# Patient Record
(demographics unavailable — no encounter records)

---

## 2022-11-14 NOTE — DIAGNOSTIC IMAGING REPORT
INDICATION: Peripheral arterial disease, left leg below the knee

amputation.



TECHNIQUE: Segmental pulse pressures were performed of the upper

and lower extremities.



FINDINGS:



Resting Doppler Blood Pressures

RIGHT

  Brachial:  156 mmHg

  Ankle (Posterior Tibial):  39 mm Hg

         Index: 0.25

  Ankle (Dorsalis Pedis):  162 mm Hg

         Index: 1.04



LEFT

  Brachial:  145 mmHg

  Low thigh:  69 mm Hg

         Index: 0.44





IMPRESSION: 

Ankle-brachial indices as above.







Ankle-Brachial Index      Diagnosis/Interpretation

<=0.90                                Peripheral Arterial Disease

0.91-0.99                            Borderline

1.00-1.40                            Normal

>1.40                                  Concern for

noncompressible arteries,

                                             (assoc with Diabetes

[place])



Dictated by: 



  Dictated on workstation # [id number] Left a detailed message for patient's daughter with results.

## 2023-01-11 NOTE — ED LOWER EXTREMITY
General


Chief Complaint:  Skin/Wound Problems


Stated Complaint:  SKIN INFECTION


Nursing Triage Note:  


Pt to ER via wheelchair w c/o wound on right foot. Sent by wound clinic for 


possible surgery consult.


Source:  patient


Exam Limitations:  no limitations





History of Present Illness


Date Seen by Provider:  Jan 11, 2023


Time Seen by Provider:  16:25


Initial Comments


Report obtained from history and review of wound care note from earlier today.





Patient is a 44-year-old male with an extensive history of diabetes, lower 

extremity venous insufficiency, several venous stasis and diabetic lower 

extremity ulcers, left BKA who presents to the emergency department for 

evaluation of a rapidly worsening wound to his right foot.  Patient was seen at 

wound care earlier today where the provider was concerned that the rapid 

worsening of the wound could be evidence of deep space infection or necrotizing 

fasciitis.  Patient denies any pain in the area.  Denies any fever or other 

systemic symptoms.  Patient has been on Zyvox for antimicrobial coverage for 

quite some time.





Allergies and Home Medications


Allergies


Coded Allergies:  


     levofloxacin (Verified  Allergy, Mild, 4/14/15)





Patient Home Medication List


Home Medication List Reviewed:  Yes


Aspirin (Aspirin EC) 81 Mg Tablet.dr, 81 MG PO DAILY, (Reported)


   Entered as Reported by: DENISE TAYLOR on 4/28/20 1436


   Last Action: Continued


Atorvastatin Calcium (Atorvastatin Calcium) 80 Mg Tablet, 80 MG PO DAILY, 

(Reported)


   Entered as Reported by: DENISE TAYLOR on 3/24/21 1229


   Last Action: Continued


Celecoxib (Celebrex) 100 Mg Capsule, 100 MG PO BID


   Prescribed by: NICOLE VILA on 1/11/23 1928


   Last Action: Continued


Empagliflozin (Jardiance) 10 Mg Tablet, 10 MG PO DAILY


   Prescribed by: NICOLE VILA on 1/11/23 1928


   Last Action: Continued


Insulin Detemir (Levemir Flextouch) 100 Unit/1 Ml Insuln.pen, 15 UNIT SQ BID


   Prescribed by: NICOLE VILA on 5/18/21 0627


   Last Action: Converted


Lisinopril/Hydrochlorothiazide (Lisinopril-Hctz 20-25 mg Tab) 1 Each Tablet, 1 

EACH PO DAILY, (Reported)


   Entered as Reported by: DENISE TAYLOR on 4/28/20 1434


   Last Action: Converted


Prasugrel HCl (Prasugrel HCl) 10 Mg Tablet, 10 MG PO DAILY, (Reported)


   Entered as Reported by: DENISE TAYLOR on 3/24/21 1229


   Last Action: Continued


Discontinued Medications


Calcium Carbonate (Tums Ultra) 400 Mg Tab.chew, 400-800 MG PO PRN PRN for 

INDIGESTION, (Reported)


   Entered as Reported by: DENISE TAYLOR on 3/24/21 1229


   Last Action: Discontinued


Gabapentin (Gabapentin) 100 Mg Capsule, 300 MG PO TID


   Prescribed by: NICOLE VILA on 5/18/21 0627


   Last Action: Discontinued


Glyburide (Glyburide) 2.5 Mg Tablet, 2.5 MG PO BID, (Reported)


   Entered as Reported by: DENISE TAYLOR on 4/28/20 1421


   Last Action: Discontinued


Insulin Aspart (Novolog Flexpen) 300 Units/3 Ml Solution, 8 UNITS SQ AC


   Prescribed by: NICOLE VILA on 5/18/21 0627


   Last Action: Discontinued


Metformin HCl (Metformin HCl ER) 500 Mg Tab.er.24, 500 MG PO BID, (Reported)


   Entered as Reported by: DENISE TAYLOR on 4/28/20 1420


   Last Action: Discontinued


Metoprolol Succinate (Metoprolol Succinate) 100 Mg Tab.er.24h, 100 MG PO DAILY, 

(Reported)


   Entered as Reported by: DENISE TAYLOR on 3/24/21 1229


   Last Action: Discontinued


Miconazole Nitrate (Lotrimin AF) 90 Gm Powder, 0 GM TOP BID


   Prescribed by: NICOLE VILA on 5/18/21 0627


   Last Action: Discontinued


Oxycodone HCl (Oxycodone HCl ER) 10 Mg Tab.er.12h, 10 MG PO BID


   Prescribed by: NICOLE VILA on 5/18/21 0627


   Last Action: Discontinued


Oxycodone Hcl (Oxyir Tablet) 5 Mg Tab, 10 MG PO DAILY


   Prescribed by: NICOLE VILA on 5/18/21 0627


   Last Action: Discontinued


Pantoprazole Sodium (Pantoprazole Sodium) 40 Mg Tablet.dr, 40 MG PO DAILY


   Prescribed by: NICOLE VILA on 5/18/21 0627


   Last Action: Discontinued





Review of Systems


Constitutional:  no symptoms reported


EENTM:  no symptoms reported


Respiratory:  no symptoms reported


Cardiovascular:  no symptoms reported


Gastrointestinal:  no symptoms reported


Genitourinary:  no symptoms reported


Musculoskeletal:  no symptoms reported


Skin:  no symptoms reported


Psychiatric/Neurological:  No Symptoms Reported





Past Medical-Social-Family Hx


Patient Social History


Tobacco Use?:  No


Substance use?:  No


Alcohol Use?:  Yes


Alcohol Frequency:  Once in a while





Immunizations Up To Date


Tetanus Booster (TDap):  More than 5yrs


COVID19 Vaccine :  pzifer





Seasonal Allergies


Seasonal Allergies:  No





Past Medical History


Surgeries:  Yes (SURGERY ON SCROTAL ABSCESS 2015, 4 cardiac stents, multiple 

left foot )


Orthopedic


Respiratory:  No


Currently Using CPAP:  No


Currently Using BIPAP:  No


Cardiac:  Yes


High Cholesterol, Hypertension


Neurological:  Yes


Neuropathy


Reproductive Disorders:  No


Sexually Transmitted Disease:  No


HIV/AIDS:  No


Genitourinary:  No


Gastrointestinal:  No


Musculoskeletal:  Yes


Amputee, Chronic Back Pain


Endocrine:  Yes (MORBID OBESITY)


Diabetes, Insulin dep


HEENT:  No


Loss of Vision:  Denies


Hearing Impairment:  Denies


Cancer:  No


Psychosocial:  No


Integumentary:  Yes (SCROTAL ABSCESS 2015; LEFT FOOT DIABETIC FOOT ULCER DX 

09/18/17)


Recent Skin Changes


Blood Disorders:  No


Adverse Reaction/Blood Tranf:  No





Family Medical History





Completed stroke


  19 MOTHER


Diabetes mellitus


  19 MOTHER


Hypertension


  19 MOTHER


  G8 BROTHER


Myocardial infarction


  19 FATHER


CAD Under 55 Years Old, Diabetes, Hypertension, Other Conditions/Hx





Physical Exam


Vital Signs





Vital Signs - First Documented








 1/11/23





 16:30


 


Temp 35.8


 


Pulse 92


 


Resp 20


 


B/P (MAP) 129/82 (98)


 


Pulse Ox 95


 


O2 Delivery Room Air





Capillary Refill : Less Than 3 Seconds


Height, Weight, BMI


Height: 6'0.00"


Weight: 422lbs. 6.4oz. [id number]; 62.00 BMI


Method:Stated


General Appearance:  WD/WN, no apparent distress


HEENT:  PERRL/EOMI, normal ENT inspection, TMs normal, pharynx normal


Neck:  non-tender, full range of motion, supple, normal inspection


Cardiovascular:  regular rate, rhythm


Respiratory:  chest non-tender, lungs clear, normal breath sounds, no 

respiratory distress, no accessory muscle use


Gastrointestinal:  normal bowel sounds, non tender, soft


Neurologic/Psychiatric:  no motor/sensory deficits, alert, normal mood/affect, 

oriented x 3


Ulcerated lesion noted to the foot in the intertriginous skin between the fourth

and fifth digits of the right foot; large amount of macerated tissue noted to 

the dorsum of the foot extending from the fifth digit to the second digit; a 

second ulcerated lesion noted to the midfoot of the right foot





Progress/Results/Core Measures


Results/Orders


Lab Results





Laboratory Tests








Test


 1/11/23


17:10 Range/Units


 


 


White Blood Count


 9.6 


 4.3-11.0


10^3/uL


 


Red Blood Count


 4.27 L


 4.30-5.52


10^6/uL


 


Hemoglobin 11.5 L 13.3-17.7  g/dL


 


Hematocrit 37 L 40-54  %


 


Mean Corpuscular Volume 86  80-99  fL


 


Mean Corpuscular Hemoglobin 27  25-34  pg


 


Mean Corpuscular Hemoglobin


Concent 31 L


 32-36  g/dL





 


Red Cell Distribution Width 15.3 H 10.0-14.5  %


 


Platelet Count


 479 H


 130-400


10^3/uL


 


Mean Platelet Volume 9.0  9.0-12.2  fL


 


Immature Granulocyte % (Auto) 2   %


 


Neutrophils (%) (Auto) 65  42-75  %


 


Lymphocytes (%) (Auto) 24  12-44  %


 


Monocytes (%) (Auto) 5  0-12  %


 


Eosinophils (%) (Auto) 3  0-10  %


 


Basophils (%) (Auto) 1  0-10  %


 


Neutrophils # (Auto)


 6.2 


 1.8-7.8


10^3/uL


 


Lymphocytes # (Auto)


 2.4 


 1.0-4.0


10^3/uL


 


Monocytes # (Auto)


 0.5 


 0.0-1.0


10^3/uL


 


Eosinophils # (Auto)


 0.3 


 0.0-0.3


10^3/uL


 


Basophils # (Auto)


 0.1 


 0.0-0.1


10^3/uL


 


Immature Granulocyte # (Auto)


 0.2 H


 0.0-0.1


10^3/uL


 


Sodium Level 136  135-145  MMOL/L


 


Potassium Level 5.1 H 3.6-5.0  MMOL/L


 


Chloride Level 103    MMOL/L


 


Carbon Dioxide Level 21  21-32  MMOL/L


 


Anion Gap 12  5-14  MMOL/L


 


Blood Urea Nitrogen 34 H 7-18  MG/DL


 


Creatinine


 1.21 


 0.60-1.30


MG/DL


 


Estimat Glomerular Filtration


Rate 76 


  





 


BUN/Creatinine Ratio 28   


 


Glucose Level 101    MG/DL


 


Lactic Acid Level


 1.33 


 0.50-2.00


MMOL/L


 


Calcium Level 9.2  8.5-10.1  MG/DL


 


Corrected Calcium 9.6  8.5-10.1  MG/DL


 


Total Bilirubin 0.2  0.1-1.0  MG/DL


 


Aspartate Amino Transf


(AST/SGOT) 23 


 5-34  U/L





 


Alanine Aminotransferase


(ALT/SGPT) 32 


 0-55  U/L





 


Alkaline Phosphatase 83    U/L


 


C-Reactive Protein High


Sensitivity 8.52 H


 0.00-0.50


MG/DL


 


Total Protein 8.1  6.4-8.2  GM/DL


 


Albumin 3.5  3.2-4.5  GM/DL








My Orders





Orders - GERA DUMONT APRMARIA TERESA


Cbc With Automated Diff (1/11/23 16:40)


Comprehensive Metabolic Panel (1/11/23 16:40)


Hs C Reactive Protein (1/11/23 16:40)


Iv/Invasive Line Insertion .IV INSERT (1/11/23 16:40)


Foot, Right, 3 View (1/11/23 16:40)


Blood Culture (1/11/23 16:44)


Lactic Acid Analyzer (1/11/23 16:44)


Piperacillin Sodium/Tazobactam (Zosyn Vi (1/11/23 17:30)


Vancomycin Injection (Vancomycin Injecti (1/11/23 17:30)


Nothing By Mouth (1/11/23 Dinner)





Medications Given in ED





Current Medications








 Medications  Dose


 Ordered  Sig/Rl


 Route  Start Time


 Stop Time Status Last Admin


Dose Admin


 


 Piperacillin Sod/


 Tazobactam Sod


 4.5 gm/Sodium


 Chloride  100 ml @ 


 200 mls/hr  ONCE  ONCE


 IV  1/11/23 17:30


 1/11/23 17:59 DC 1/11/23 17:47


200 MLS/HR


 


 Vancomycin HCl


 1000 mg/Sodium


 Chloride  250 ml @ 


 250 mls/hr  ONCE  ONCE


 IV  1/11/23 17:30


 1/11/23 18:29  1/11/23 18:01


250 MLS/HR








Vital Signs/I&O











 1/11/23





 16:30


 


Temp 35.8


 


Pulse 92


 


Resp 20


 


B/P (MAP) 129/82 (98)


 


Pulse Ox 95


 


O2 Delivery Room Air














Blood Pressure Mean:                    98











Progress


Progress Note :  


   Progress Note


Patient is nontoxic and well-hydrated on exam.  Examination of the dorsum of the

right foot reveals extensive macerated tissue and ulceration.  Patient's vital 

signs are stable.





Orders placed for CBC, CMP, CRP, IV insertion, right foot x-rays, lactic acid, 

and blood cultures.





CBC does not reveal leukocytosis.  Mild anemia noted.  Elevated platelets noted.

 CMP shows mild hyperkalemia.  CRP is elevated to above 8 today.  Last CRP was 

6.21 on 12/28 as noted on the wound care note.  Foot x-ray reveals some bony 

destruction concerning for osteomyelitis as well as some possible free air.





I spoke with Dr. Agrawal with general surgery who came in and physically assessed

the patient.  Patient will be taken to the OR for debridement and possible 

partial foot amputation.  I spoke with Dr. Vila with hospital medicine who 

kindly agreed to admit the patient.  Orders for IV vancomycin and Zosyn replaced

in the ER.  Patient was updated on plan of care and plan for admission.  He 

verbalized understanding.





Departure


Impression





   Primary Impression:  


   Diabetic ulcer of right foot


   Qualified Codes:  E11.621 - Type 2 diabetes mellitus with foot ulcer; L97.518

   - Non-pressure chronic ulcer of other part of right foot with other specified

   severity


Disposition:  09 ADMITTED AS INPATIENT


Condition:  Stable





Admissions


Decision to Admit Reason:  Admit from ER (General)


Decision to Admit/Date:  Jan 11, 2023


Time/Decision to Admit Time:  18:25





Departure-Patient Inst.


Referrals:  


[facility] (PCP/Family)


Primary Care Physician


Scripts


Empagliflozin (Jardiance) 10 Mg Tablet


10 MG PO DAILY for 30 Days, TAB


   Prov: NICOLE VILA DO         1/11/23 


Celecoxib (Celebrex) 100 Mg Capsule


100 MG PO BID for 30 Days, CAP


   Prov: NICOLE VILA DO         1/11/23











GERA DUMONT             Jan 11, 2023 17:20

## 2023-01-11 NOTE — CONSULTATION - SURGERY
History of Present Illness


History of Present Illness


Patient Consulted On(steff/time)


1/11/23


 18:44


Date Seen by Provider:  Jan 11, 2023


Time Seen by Provider:  18:45


History of Present Illness


Consult requested by Feliz Tee for right foot wound.





Patient is a 44 year old male with right foot wound.  He had a heel wound 6 

months ago which he was working with wound care and has healed.  He subsequently

developed a blister to the right plantar forefoot.  Has been continuing wound 

care.  Over the last couple days this has worsened.  He was sent to ED for 

further evaluation.  He had a right foot x ray with findings suspicious for 

osteomyelitis involving the fifth metatarsal head and adjacent proximal fifth 

phalanx with a


superimposed fracture of the fifth proximal phalanx suspected. 2. Subcutaneous 

air overlying the fifth metatarsophalangeal joint level. This could be due to an

overlying ulceration. Necrotizing fasciitis not excluded. Dedicated MRI of the 

forefoot with and


without contrast could provide further characterization. He had necrotizing 

fasciitis on left lower leg which he had to have left bka for.  Patient with 

sharp tingling pain that is constant, but states doesn't really have sensation 

to the foot. Has a burn to the left bka but this is almost healed, he is not 

wearing prosthetic because of it.





Allergies and Home Medications


Allergies


Coded Allergies:  


     levofloxacin (Verified  Allergy, Mild, 4/14/15)





Patient Home Medication List


Home Medication List Reviewed:  Yes


Aspirin (Aspirin EC) 81 Mg Tablet.dr, 81 MG PO DAILY, (Reported)


   Entered as Reported by: DENISE TAYLOR on 4/28/20 1436


Atorvastatin Calcium (Atorvastatin Calcium) 80 Mg Tablet, 80 MG PO DAILY, 

(Reported)


   Entered as Reported by: DENISE TAYLOR on 3/24/21 1229


Calcium Carbonate (Tums Ultra) 400 Mg Tab.chew, 400-800 MG PO PRN PRN for 

INDIGESTION, (Reported)


   Entered as Reported by: DENISE TAYLOR on 3/24/21 1229


Gabapentin (Gabapentin) 100 Mg Capsule, 300 MG PO TID


   Prescribed by: NICOLE VILA on 5/18/21 0627


Glyburide (Glyburide) 2.5 Mg Tablet, 2.5 MG PO BID, (Reported)


   Entered as Reported by: DENISE TAYLOR on 4/28/20 1421


Insulin Aspart (Novolog Flexpen) 300 Units/3 Ml Solution, 8 UNITS SQ AC


   Prescribed by: NICOLE VILA on 5/18/21 0627


Insulin Detemir (Levemir Flextouch) 100 Unit/1 Ml Insuln.pen, 15 UNIT SQ BID


   Prescribed by: NICOLE VILA on 5/18/21 0627


Lisinopril/Hydrochlorothiazide (Lisinopril-Hctz 20-25 mg Tab) 1 Each Tablet, 1 

EACH PO DAILY, (Reported)


   Entered as Reported by: DENISE TAYLOR on 4/28/20 1434


Metformin HCl (Metformin HCl ER) 500 Mg Tab.er.24, 500 MG PO BID, (Reported)


   Entered as Reported by: DENISE TAYLOR on 4/28/20 1420


Metoprolol Succinate (Metoprolol Succinate) 100 Mg Tab.er.24h, 100 MG PO DAILY, 

(Reported)


   Entered as Reported by: DENISE TAYLOR on 3/24/21 1229


Miconazole Nitrate (Lotrimin AF) 90 Gm Powder, 0 GM TOP BID


   Prescribed by: NICOLE VILA on 5/18/21 0627


Oxycodone HCl (Oxycodone HCl ER) 10 Mg Tab.er.12h, 10 MG PO BID


   Prescribed by: NICOLE VILA on 5/18/21 0627


Oxycodone Hcl (Oxyir Tablet) 5 Mg Tab, 10 MG PO DAILY


   Prescribed by: NICOLE VIAL on 5/18/21 0627


Pantoprazole Sodium (Pantoprazole Sodium) 40 Mg Tablet.dr, 40 MG PO DAILY


   Prescribed by: NICOLE VILA on 5/18/21 0627


Prasugrel HCl (Prasugrel HCl) 10 Mg Tablet, 10 MG PO DAILY, (Reported)


   Entered as Reported by: DENISE TAYLOR on 3/24/21 1229





Past Medical-Social-Family Hx


Patient Social History


Former Smoker, Quit:  Jan 1, 2003


Type Used:  Cigarettes, Electronic/Vapor


Recent Hopitalizations:  No


Alcohol Use?:  Yes





Immunizations Up To Date


Tetanus Booster (TDap):  More than 5yrs


Date of Pneumonia Vaccine:  Jan 1, 2007





Seasonal Allergies


Seasonal Allergies:  No





Surgeries


History of Surgeries:  Yes (SURGERY ON SCROTAL ABSCESS 2015, 4 cardiac stents, 

multiple left foot/bka)


Surgeries:  Orthopedic





Respiratory


History of Respiratory Disorde:  No





Cardiovascular


History of Cardiac Disorders:  Yes


Cardiac Disorders:  High Cholesterol, Hypertension





Neurological


History of Neurological Disord:  Yes


Neurological Disorders:  Neuropathy





Reproductive System


Hx Reproductive Disorders:  No


Sexually Transmitted Disease:  No


HIV/AIDS:  No





Genitourinary


History of Genitourinary Disor:  No





Gastrointestinal


History of Gastrointestinal Di:  No





Musculoskeletal


History of Musculoskeletal Dis:  Yes


Musculoskeletal Disorders:  Amputee, Chronic Back Pain





Endocrine


History of Endocrine Disorders:  Yes (MORBID OBESITY)


Endocrine Disorders:  Diabetes, Insulin dep





HEENT


History of HEENT Disorders:  No


Loss of Vision:  Denies


Hearing Impairment:  Denies





Cancer


History of Cancer:  No





Psychosocial


History of Psychiatric Problem:  No





Integumentary


History of Skin or Integumenta:  Yes (SCROTAL ABSCESS 2015; LEFT FOOT DIABETIC 

FOOT ULCER DX 09/18/17)


Skin/Integumentary Disorders:  Recent Skin Changes





Blood Transfusions


History of Blood Disorders:  No


Adverse Reaction to a Blood Tr:  No





Reviewed Nursing Assessment


Reviewed/Agree w Nursing PMH:  Yes





Family Medical History


Significant Family History:  CAD Under 55 Years Old, Diabetes, Hypertension, 

Other Conditions/Hx


Family Medial History:  


Completed stroke


  19 MOTHER


Diabetes mellitus


  19 MOTHER


Hypertension


  19 MOTHER


  G8 BROTHER


Myocardial infarction


  19 FATHER





Review of Systems-General


Constitutional:  No chills, No diaphoresis


EENTM:  No blurred vision, No double vision


Respiratory:  No cough, No dyspnea on exertion


Cardiovascular:  No chest pain, No palpitations


Gastrointestinal:  No nausea, No vomiting


Genitourinary:  No decreased output, No discharge


Musculoskeletal:  No back pain, No joint pain


Skin:  change in color; No change in hair/nails


Psychiatric/Neurological:  Denies Anxiety, Denies Depressed, Denies Emotional 

Problems


All Other Systems Reviewed


Negative Unless Noted:  Yes (Negative excepted noted.)





Physical Exam-General Problems


Physical Exam


Vital Signs





Vital Signs - First Documented








 1/11/23





 16:30


 


Temp 35.8


 


Pulse 92


 


Resp 20


 


B/P (MAP) 129/82 (98)


 


Pulse Ox 95


 


O2 Delivery Room Air





Capillary Refill : Less Than 3 Seconds


General Appearance:  WD/WN, no apparent distress, obese


HEENT:  PERRL/EOMI, normal ENT inspection


Neck:  non-tender, supple


Respiratory:  chest non-tender, no respiratory distress, no accessory muscle use


Cardiovascular:  regular rate, rhythm, no JVD


Gastrointestinal:  non tender, soft


Rectal:  deferred


Back:  no CVA tenderness


Extremities:  non-tender, other (left bka, right plantar surface necrotic wound 

approximately 4x5 cm and 5 toe edematous and ulcerated)


Neurologic/Psychiatric:  alert, oriented x 3, other (decreased sensation lower 

ext)


Skin:  other (necrotic appearance of tissue right foot, chronic skin changes 

right lower extremity,  left bka lateral aspect scab from burn)


Lymphatic:  no adenopathy





Data Review


Labs


Laboratory Tests


1/11/23 17:10: 


White Blood Count 9.6, Red Blood Count 4.27L, Hemoglobin 11.5L, Hematocrit 37L, 

Mean Corpuscular Volume 86, Mean Corpuscular Hemoglobin 27, Mean Corpuscular 

Hemoglobin Concent 31L, Red Cell Distribution Width 15.3H, Platelet Count 479H, 

Mean Platelet Volume 9.0, Immature Granulocyte % (Auto) 2, Neutrophils (%) 

(Auto) 65, Lymphocytes (%) (Auto) 24, Monocytes (%) (Auto) 5, Eosinophils (%) 

(Auto) 3, Basophils (%) (Auto) 1, Neutrophils # (Auto) 6.2, Lymphocytes # (Auto)

2.4, Monocytes # (Auto) 0.5, Eosinophils # (Auto) 0.3, Basophils # (Auto) 0.1, 

Immature Granulocyte # (Auto) 0.2H, Sodium Level 136, Potassium Level 5.1H, Chl

oride Level 103, Carbon Dioxide Level 21, Anion Gap 12, Blood Urea Nitrogen 34H,

Creatinine 1.21, Estimat Glomerular Filtration Rate 76, BUN/Creatinine Ratio 28,

Glucose Level 101, Lactic Acid Level 1.33, Calcium Level 9.2, Corrected Calcium 

9.6, Total Bilirubin 0.2, Aspartate Amino Transf (AST/SGOT) 23, Alanine 

Aminotransferase (ALT/SGPT) 32, Alkaline Phosphatase 83, C-Reactive Protein High

Sensitivity 8.52H, Total Protein 8.1, Albumin 3.5





Assessment/Plan


right foot osteomyelitis


plantar foot ulcer with concern for necrotizing fasciitis


morbid obesity


DM insulin dependent


Long term antiplatelet








Concern for patient having necrotizing fasciitis, X rays reviewed with him.  


Patient understands need for operative intervention.  He understands risks


and benefits of partial right foot amputation all other indicated procedures.


On Vanc/Zosyn


NPO


IV fluids


Consent


To OR.











BRYAN NG DO              Jan 11, 2023 18:50

## 2023-01-11 NOTE — DIAGNOSTIC IMAGING REPORT
INDICATION: Worsening foot ulcers. Necrotizing infection.



EXAMINATION: Right foot from 01/11/2023.



FINDINGS: Three views of the foot.



There is subcutaneous air along the lateral soft tissues of the

forefoot overlying the fifth metatarsophalangeal joint. Marked

soft tissue swelling about the fifth phalanx is noted. There is a

lucency throughout the mid and proximal aspects of the proximal

fifth phalanx with a superimposed fracture. Lucency along the

distal fifth metatarsal is also noted and findings are suspicious

for osteomyelitis.



Marked degenerative changes with spurring and narrowing noted at

the second metatarsophalangeal joint. The remaining osseous

structures appear unremarkable. Plantar and posterior calcaneal

spurring noted.



IMPRESSION:

1. Findings suspicious for osteomyelitis involving the fifth

metatarsal head and adjacent proximal fifth phalanx with a

superimposed fracture of the fifth proximal phalanx suspected.

2. Subcutaneous air overlying the fifth metatarsophalangeal joint

level. This could be due to an overlying ulceration. Necrotizing

fasciitis not excluded. Dedicated MRI of the forefoot with and

without contrast could provide further characterization.



Dictated by: 



  Dictated on workstation # [id number]

## 2023-01-11 NOTE — ANESTHESIA-GENERAL POST-OP
General


Patient Condition


Mental Status/LOC:  Same as Preop


Cardiovascular:  Satisfactory


Nausea/Vomiting:  Absent


Respiratory:  Satisfactory


Pain:  Controlled


Complications:  Absent





Post Op Complications


Complications


None





Follow Up Care/Instructions


Patient Instructions


None needed.





Anesthesia/Patient Condition


Patient Condition


Patient is doing well, no complaints, stable vital signs, no apparent adverse 

anesthesia problems.   


No complications reported per nursing.


D/C home per [facility] Criteria:  Yes











YANG CHANDLER CRNA           Jan 11, 2023 20:35

## 2023-01-12 NOTE — HISTORY & PHYSICAL-HOSPITALIST
TABITHA CONLEY 1/12/23 1340:


History of Present Illness


HPI/Chief Complaint


Aj Gary is a 44 year old male with history of venous insufficiency, katrin

betes, history of cardiac catheterization, and diabetic neuropathy who presented

to the ED after request from wound care. He follows with wound care weekly for 

diabetic foot ulcers. Between visits this past week, he noticed that his blood 

sugar levels were difficult to control. Upon subsequent encounter with wound 

care he was directed to go to the ED for further evaluation. He was seen and 

evaluated by surgery, and underwent right 5th digit amputation and debridement. 

Medicine team is following peripherally for post operative medical management. 

Upon examination today, his foot had been rewrapped following post operative 

rebleed which surgery stopped at bedside. He is eating well, voiding and BM's 

regular. No chest pain, cough, fever, shortness of breath or pain at operative 

site.


Source:  patient


Exam Limitations:  no limitations


Date Seen


1/12/23


Time Seen by a Provider:  07:50


Attending Physician


[facility]


PCP


Admitting Physician:


Siobhan Barrera DO 








Attending Physician:


Siobhan Barrera DO


Referring Physician





Date of Admission


Jan 11, 2023 at 22:37





Home Medications & Allergies


Home Medications


Reviewed patient Home Medication Reconciliation performed by pharmacy medication

reconciliations technician and/or nursing.


Patients Allergies have been reviewed.





Allergies





Allergies


Coded Allergies


  levofloxacin (Verified Allergy, Mild, 4/14/15)








Past Medical-Social-Family Hx


Patient Social History


Tobacco Use?:  No


Use of E-Cig and/or Vaping dev:  No


Substance use?:  No


Alcohol Use?:  Yes


Alcohol Frequency:  Once in a while


Pt feels they are or have been:  No





Immunizations Up To Date


Tetanus Booster (TDap):  Unknown


Hepatitis A:  Yes


Hepatitis B:  Yes


Date of Pneumonia Vaccine:  Jan 1, 2007





Seasonal Allergies


Seasonal Allergies:  No





Current Status


Advance Directives:  No


Communicates:  Verbally


Primary Language:  English


Preferred Spoken Language:  English


Is interpretation needed?:  No


Sensory deficits:  Vision impairment


Implanted or Applied Medical D:  Stents





Past Medical History


Surgeries:  Orthopedic


Currently Using CPAP:  No


Currently Using BIPAP:  No


High Cholesterol, Hypertension


Neuropathy


Sexually Transmitted Disease:  No


HIV/AIDS:  No


Amputee, Chronic Back Pain


Diabetes, Insulin dep


Loss of Vision:  Denies


Hearing Impairment:  Denies


Recent Skin Changes


Blood Disorders:  No


Adverse Reaction/Blood Tranf:  No





Past medical history


1.  Diabetes mellitus type II


2.  hospitalizations for HHN K


3.  Morbid obesity


4.  Patella fracture and meniscus injury of the left knee


5.  Diabetic peripheral neuropathy


6.  Chronic venous insufficiency with chronic skin changes


7. Tibia (2x) and fibula fracture of left leg


8. Tibia and fibula fracture of right leg


9. Diabetic arthritis of left knee


10. Diabetic right frozen shoulder


11. HTN


SurgHx:


Removal of infected bone/bone fragments from left midfoot





Family Medical History





Completed stroke


  19 MOTHER


Diabetes mellitus


  19 MOTHER


Hypertension


  19 MOTHER


  G8 BROTHER


Myocardial infarction


  19 FATHER


CAD Under 55 Years Old, Diabetes, Hypertension, Other Conditions/Hx





Review of Systems


Constitutional:  see HPI


EENTM:  see HPI


Respiratory:  see HPI


Cardiovascular:  see HPI


Gastrointestinal:  see HPI


Genitourinary:  see HPI


Musculoskeletal:  see HPI


Skin:  see HPI


Psychiatric/Neurological:  See HPI





Physical Exam


Physical Exam


Vital Signs





Vital Signs - First Documented








 1/11/23 1/11/23





 16:30 21:41


 


Temp 35.8 


 


Pulse 92 


 


Resp 20 


 


B/P (MAP) 129/82 (98) 


 


Pulse Ox 95 


 


O2 Delivery Room Air 


 


FiO2  21





Capillary Refill : Less Than 3 Seconds


Height, Weight, BMI


Height: 6'0.00"


Weight: 422lbs. 6.4oz. [id number]; 65.03 BMI


Method:Stated


General Appearance:  No Apparent Distress, Obese


Eyes:  Bilateral Eye Normal Inspection, Bilateral Eye PERRL, Bilateral Eye EOMI


HEENT:  PERRL/EOMI, Normal ENT Inspection, Pharynx Normal


Neck:  Full Range of Motion, Normal Inspection, Supple


Respiratory:  Chest Non Tender, Lungs Clear, Normal Breath Sounds


Cardiovascular:  Regular Rate, Rhythm, No Edema


Gastrointestinal:  Normal Bowel Sounds, No Organomegaly


Extremity:  Other (Left leg BKA, Right foot in clean dressing with no visable 

surrounding erythema)


Neurologic/Psychiatric:  Alert, Oriented x3, No Motor/Sensory Deficits, Normal 

Mood/Affect


Skin:  Tattoos/Piercings (On chest)





Results


Results/Procedures


Labs


Laboratory Tests


1/11/23 17:10








1/12/23 05:24








Patient resulted labs reviewed.





Assessment/Plan


Admission Diagnosis


Infected Diabetic Foot Ulcer





Assessment and Plan


Diabetic foot ulcer complicated by necrotizing infection


- Foot x ray suspicious for osteomyelitis involving fifth met head, with 

superimposed fracture of fifth proximal phalanx and subcutaneous air overlying 

the fifth metatarsophalangeal joint.


- Right fifth digit and fifth metatarsal head amputation - POD 1


- Post-operative management by Surgery


- Longstanding history of diabetes


- Follow with wound care after d/c


- WBC stable


- on Vanc, Pip tazo





Anemia


- Hgb 9.4


- will monitor





HTN


- continue home medications





Diabetes mellitus


- continue home medicine


- on ISS


- monitor glucose





Dyslipidemia 


- on Atorvstatin


- monitor AST/ALT





Peripheral neuropathy


- From diabetes


- Continue diabetic management


- Gabapentin prn for neuralgia





History of Obesity


- encourage calorie restriction


- prn position changes





LLE BKA


- PT/OT





Clinical Quality Measures


DVT/VTE Risk/Contraindication:


Contraindications-Mechi:  Other *list below*


Other:  


infected ulcer





SIOBHAN BARRERA DO 1/13/23 0520:


History of Present Illness


Source:  patient


Exam Limitations:  no limitations





Past Medical-Social-Family Hx


Patient Social History


Marrital Status:  


Employed/Student:  unemployed





Family Medical History





Completed stroke


  19 MOTHER


Diabetes mellitus


  19 MOTHER


Hypertension


  19 MOTHER


  G8 BROTHER


Myocardial infarction


  19 FATHER





Review of Systems


Constitutional:  see HPI





Physical Exam


Physical Exam


General Appearance:  No Apparent Distress, Chronically ill, Obese


Respiratory:  Lungs Clear, Normal Breath Sounds


Cardiovascular:  Regular Rate, Rhythm





Assessment/Plan


Admission Diagnosis


Assessment:


Diabetic foot ulcer right fifth toe status post amputation


Cellulitis


Diabetes


Obesity


Hypertension





Plan:


IV antibiotics


Supportive care


Admission Status:  Inpatient Order (span 2 midnights)


Reason for Inpatient Admission:  


Diabetic foot ulcer





Supervisory-Addendum Brief


Verification & Attestation


Participated in pt care:  history, MDM, physical


Personally performed:  exam, history, MDM, supervision of care


Care discussed with:  Medical Student


Procedures:  n/a


Results interpretation:  Verified all documentation


Verification and Attestation of Medical Student E/M Service





A medical student performed and documented this service in my presence. I 

reviewed and verified all information documented by the medical student and made

modifications to such information, when appropriate. I personally performed the 

physical exam and medical decision making. 





 Siobhan Barrera, Jan 13, 2023,05:20











TABITHA CONLEY               Jan 12, 2023 13:40


SIOBHAN BARRERA DO                Jan 13, 2023 05:20

## 2023-01-13 NOTE — PROGRESS NOTE - HOSPITALIST
TABITHA CONLEY 1/13/23 1327:


Subjective


HPI/CC On Admission


Aj Gary is a 44 year old male with history of venous insufficiency, 

diabetes, history of cardiac catheterization, and diabetic neuropathy who 

presented to the ED after request from wound care. He follows with wound care 

weekly for diabetic foot ulcers. Between visits this past week, he noticed that 

his blood sugar levels were difficult to control. Upon subsequent encounter with

wound care he was directed to go to the ED for further evaluation. He was seen 

and evaluated by surgery, and underwent right 5th digit amputation and 

debridement. Medicine team is following peripherally for post operative medical 

management.


Subjective/Events-last exam


Aj Gary is a 44 year old male with history of venous insufficiency, 

diabetes, history of cardiac catheterization, and diabetic neuropathy admitted 

for necrotizing diabetic foot ulcer. Today is POD 2 following amputation and 

debridement of the right fifth toe. No acute events overnight. Reports BG is a 

little high. No pain at operative site. Denies cough, chest pain, or swelling. 

Is having BM and voiding regularly. Tolerating antibiotics well. Reports some 

bleeding from recent transfer to and from bedside commode.





Focused Exam


Lactate Level


1/11/23 17:10: Lactic Acid Level 1.33








Objective


Exam


Vital Signs





Vital Signs








  Date Time  Temp Pulse Resp B/P (MAP) Pulse Ox O2 Delivery O2 Flow Rate FiO2


 


1/13/23 11:55 36.2 80 18 138/84 (102) 95 Room Air  


 


1/11/23 21:41        21


 


1/11/23 20:40       10.00 





Capillary Refill : Less Than 3 Seconds


General Appearance:  No Apparent Distress, WD/WN, Obese


HEENT:  PERRL/EOMI, TMs Normal, Normal ENT Inspection, Pharynx Normal, Moist 

Mucous Membranes


Neck:  Full Range of Motion, Normal Inspection, Non Tender, Supple


Respiratory:  Chest Non Tender, Lungs Clear, Normal Breath Sounds


Cardiovascular:  Regular Rate, Rhythm, No Edema, No Gallop, No Murmur


Gastrointestinal:  Normal Bowel Sounds, No Organomegaly, No Pulsatile Mass, Non 

Tender, Soft


Rectal:  Deferred


Extremity:  Normal Capillary Refill, Normal Inspection, Normal Range of Motion, 

Other (LLE BKA, no pain at operative site)


Neurologic/Psychiatric:  Alert, Oriented x3, Normal Mood/Affect, CNs II-XII Norm

as Tested, Sensory Deficit (no sensation up to level of knees.)


Skin:  Normal Color (site dressed with mild serosanguinous discharge)





Results/Procedures


Lab


Laboratory Tests


1/13/23 05:00








Patient resulted labs reviewed.





Assessment/Plan


Assessment and Plan


Assess & Plan/Chief Complaint


Diabetic foot ulcer complicated by necrotizing infection


- Foot x ray suspicious for osteomyelitis involving fifth met head, with 

superimposed fracture of fifth proximal phalanx and subcutaneous air overlying 

the fifth metatarsophalangeal joint.


- Right fifth digit and fifth metatarsal head amputation - POD 2


- Post-operative management by Surgery


- Longstanding history of diabetes


- Follow with wound care after d/c


- WBC stable


- on Vanc


- Blood cx: NGTD


- UTD recommendations: Vanc and 3rd gen cephalosporin for 4-6 weeks after non-

hemotogenous osteomyelitis debridement with 86% success rate. Oral may be used 

for up to 12 weeks if bacteria is cultured and susceptible to highly orally 

available antibiotics.


- consider inflammatory markers toward end of treatment course for confirmed 

resolution. 





Anemia


- following recent surgery


- without platelet, or WBC aberration


- Hgb improving


- will monitor





HTN


- continue home medications





Diabetes mellitus


- continue home medicine


- on ISS


- monitor glucose





Dyslipidemia 


- on Atorvstatin


- monitor AST/ALT





Peripheral neuropathy


- From diabetes


- Continue diabetic management


- Gabapentin prn for neuralgia





History of Obesity


- encourage calorie restriction


- prn position changes





LLE BKA


- PT/OT





DVT ppx


- lovenox 40





Clinical Quality Measures


DVT/VTE Risk/Contraindication:


Contraindications-Mechi:  Other *list below*


Other:  


infected ulcer





NICOLE VILA DO 1/14/23 0737:


Supervisory-Addendum Brief


Verification & Attestation


Participated in pt care:  history, MDM, physical


Personally performed:  exam, history, MDM, supervision of care


Care discussed with:  Medical Student


Procedures:  n/a


Results interpretation:  Verified all documentation


Verification and Attestation of Medical Student E/M Service





A medical student performed and documented this service in my presence. I 

reviewed and verified all information documented by the medical student and made

modifications to such information, when appropriate. I personally performed the 

physical exam and medical decision making. 





 Nicole Vila, Jan 14, 2023,07:37











TABITHA CONLEY               Jan 13, 2023 13:27


NICOLE VILA DO                Jan 14, 2023 07:37

## 2023-01-14 NOTE — PROGRESS NOTE
Subjective


Date Seen by a Provider:  Jan 14, 2023


Time Seen by a Provider:  11:10


Subjective/Events-last exam


Patient seen with Dr. Jiménez.  Patient reports doing well.  Denies any right foot 

pain.  Tolerating diet.





Focused Exam


Lactate Level


1/11/23 17:10: Lactic Acid Level 1.33





Objective


Exam





Vital Signs








  Date Time  Temp Pulse Resp B/P (MAP) Pulse Ox O2 Delivery O2 Flow Rate FiO2


 


1/14/23 10:26 36.4 73   98   


 


1/14/23 10:25     98 Room Air  


 


1/14/23 07:27 36.4 73 18 155/94 (114) 97 Room Air  


 


1/14/23 03:52 36.0 78 18 155/89 (111) 97 Room Air  


 


1/13/23 23:53 36.3 72 18 143/85 (104) 97 Room Air  


 


1/13/23 20:00      Room Air  


 


1/13/23 20:00 36.4 81 18 137/83 (101) 96 Room Air  


 


1/13/23 16:59 36.6 76 20 143/86 (105) 97 Room Air  


 


1/13/23 11:55 36.2 80 18 138/84 (102) 95 Room Air  














I & O 


 


 1/14/23





 07:00


 


Intake Total 2110 ml


 


Output Total 700 ml


 


Balance 1410 ml





Capillary Refill : Less Than 3 Seconds


General Appearance:  No Apparent Distress, WD/WN, Obese


Neck:  Normal Inspection, Supple


Respiratory:  No Accessory Muscle Use, No Respiratory Distress


Gastrointestinal:  normal bowel sounds, non tender, soft


Extremity:  Other (right lateral foot incision with some bloody drainage on d

ressing.  Incision with no signs of infection.)


Neurologic/Psychiatric:  Alert, Oriented x3


Skin:  Normal Color, Warm/Dry





Results


Lab


Laboratory Tests


1/13/23 17:18: Glucometer 154H


1/13/23 20:28: Glucometer 144H


1/14/23 04:50: Glucometer 105


1/14/23 05:47: 


White Blood Count 6.8, Red Blood Count 3.62L, Hemoglobin 9.9L, Hematocrit 32L, 

Mean Corpuscular Volume 87, Mean Corpuscular Hemoglobin 27, Mean Corpuscular 

Hemoglobin Concent 31L, Red Cell Distribution Width 15.1H, Platelet Count 350, 

Mean Platelet Volume 8.6L, Immature Granulocyte % (Auto) 4, Neutrophils (%) 

(Auto) 46, Lymphocytes (%) (Auto) 41, Monocytes (%) (Auto) 5, Eosinophils (%) 

(Auto) 4, Basophils (%) (Auto) 0, Neutrophils # (Auto) 3.1, Lymphocytes # (Auto)

2.8, Monocytes # (Auto) 0.4, Eosinophils # (Auto) 0.3, Basophils # (Auto) 0.0, 

Immature Granulocyte # (Auto) 0.2H, Sodium Level 139, Potassium Level 4.7, 

Chloride Level 110H, Carbon Dioxide Level 21, Anion Gap 8, Blood Urea Nitrogen 

14, Creatinine 0.86, Estimat Glomerular Filtration Rate 109, BUN/Creatinine 

Ratio 16, Glucose Level 108H, Calcium Level 9.1, Corrected Calcium 9.9, Total 

Bilirubin 0.2, Aspartate Amino Transf (AST/SGOT) 36H, Alanine Aminotransferase 

(ALT/SGPT) 34, Alkaline Phosphatase 65, Total Protein 6.7, Albumin 3.0L


1/14/23 10:37: Glucometer 154H





Microbiology


1/11/23 Blood Culture - Preliminary, Resulted


          No growth





Assessment/Plan


Assessment/Plan


Assess & Plan/Chief Complaint


A 44 year old male with right foot osteomyelitis


plantar foot ulcer with concern for necrotizing fasciitis


S/p right fifth digit and fifth metatarsal head (5th partial ray amputation) 

amputation - POD 3


morbid obesity


DM insulin dependent


Long term antiplatelet








Wound healing well. No bleeding over night. Mild oozing on dressing, dressing 

removed and will redress. Pain under control


Discussed appropriate wound care with pt.   


Regular diet


Continue Abx 


Pain control 


Physical Therapy





Clinical Quality Measures


DVT/VTE Risk/Contraindication:


Contraindications-Mechi:  Other *list below*


Other:  


infected ulcer











NEGIN CHA APRN          Jan 14, 2023 11:43

## 2023-01-14 NOTE — OPERATIVE REPORT
DATE OF SERVICE: 01/11/2023



PREOPERATIVE DIAGNOSES:

Right foot wound, possible necrotizing fasciitis and osteomyelitis.



POSTOPERATIVE DIAGNOSES:

Osteomyelitis, right forefoot with localized necrotizing fasciitis.



PROCEDURE:

Right partial fifth ray amputation, debridement of 7 x 5.5 cm skin and 

subcutaneous tissue.



SURGEON:

Bryan Agrawal DO.



TYPE OF ANESTHESIA:

General.



ESTIMATED BLOOD LOSS:

Minimal.



COMPLICATIONS:

None.



INDICATIONS:

The patient is a 44-year-old male who has a right foot wound.  He has been going

to wound care for it.  It has worsened and he was sent to the emergency 

department for further evaluation.  An x-ray demonstrating some gas and 

subcutaneous tissue.  The patient is unable due to morbid obesity to have an MRI

or CT scan and concerning for necrotizing fasciitis.  Therefore, the patient was

taken to the operating room.  He understood risks and benefits of the procedure 

and wished to proceed.  Consent was signed on chart.



DESCRIPTION OF PROCEDURE:

The patient was taken to the operating suite where he was prepped and draped in 

sterile fashion.  Timeout was performed.  At the right fifth toe, a 15 blade 

scalpel was used to excise around it and cautery used to dissect down through 

subcutaneous tissues and this was amputated at just distal to the metatarsal 

head.  Cautery was then used to go around the ulceration and necrotic tissue 

that was on the plantar surface of the foot, encompassing through the incision. 

Cautery was used to excise all of the skin and subcutaneous tissue.  It was also

used to control hemostasis.  Metatarsal was exposed and continue to debride and 

exposed the fifth metatarsal head and a bone saw was then used to amputate 

proximal to the metatarsal head where it did not appear to have any evidence of 

osteomyelitis.  The wound was then irrigated with copious amounts of irrigation.

 The dimensions of the debridement were listed as above, which were 7 x 5.5 cm. 

The wound was then packed and sterile bandage was applied.  The patient 

tolerated the procedure well without any complications, taken to recovery room 

in stable condition.













Job ID: [id number]

DocumentID: [id number]

Dictated Date: 01/13/2023 16:16:09

Transcription Date: 01/14/2023 04:18:00

Dictated By: BRYAN AGRAWAL DO

## 2023-01-14 NOTE — PROGRESS NOTE - HOSPITALIST
Subjective


HPI/CC On Admission


Date Seen by Provider:  Jan 14, 2023


Time Seen by Provider:  11:00


Aj Gary is a 44 year old male with history of venous insufficiency, 

diabetes, history of cardiac catheterization, and diabetic neuropathy who 

presented to the ED after request from wound care. He follows with wound care 

weekly for diabetic foot ulcers. Between visits this past week, he noticed that 

his blood sugar levels were difficult to control. Upon subsequent encounter with

wound care he was directed to go to the ED for further evaluation. He was seen 

and evaluated by surgery, and underwent right 5th digit amputation and 

debridement. Medicine team is following peripherally for post operative medical 

management.


Subjective/Events-last exam


No major issues


No more wound bleeding


IV abx maintained


Pain controlled





Focused Exam


Lactate Level


1/11/23 17:10: Lactic Acid Level 1.33








Objective


Exam


Vital Signs





Vital Signs








  Date Time  Temp Pulse Resp B/P (MAP) Pulse Ox O2 Delivery O2 Flow Rate FiO2


 


1/14/23 11:57 36.3 82 18 137/96 (110) 97 Room Air  


 


1/11/23 21:41        21


 


1/11/23 20:40       10.00 





Capillary Refill : Less Than 3 Seconds


General Appearance:  No Apparent Distress, WD/WN, Chronically ill, Obese


Respiratory:  Lungs Clear, Normal Breath Sounds


Cardiovascular:  Regular Rate, Rhythm


Neurologic/Psychiatric:  Alert, Oriented x3, No Motor/Sensory Deficits, Normal 

Mood/Affect





Results/Procedures


Lab


Laboratory Tests


1/14/23 05:47








Patient resulted labs reviewed.





Assessment/Plan


Assessment and Plan


Assess & Plan/Chief Complaint


Diabetic foot ulcer complicated by necrotizing infection


- Foot x ray suspicious for osteomyelitis involving fifth met head, with 

superimposed fracture of fifth proximal phalanx and subcutaneous air overlying 

the fifth metatarsophalangeal joint.


- Right fifth digit and fifth metatarsal head amputation - POD 2


- Post-operative management by Surgery


- Longstanding history of diabetes


- Follow with wound care after d/c


- WBC stable


- on Vanc


- Blood cx: NGTD


- UTD recommendations: Vanc and 3rd gen cephalosporin for 4-6 weeks after non-

hemotogenous osteomyelitis debridement with 86% success rate. Oral may be used 

for up to 12 weeks if bacteria is cultured and susceptible to highly orally 

available antibiotics.


- consider inflammatory markers toward end of treatment course for confirmed 

resolution. 





Anemia


- following recent surgery


- without platelet, or WBC aberration


- Hgb improving


- will monitor





HTN


- continue home medications





Diabetes mellitus


- continue home medicine


- on ISS


- monitor glucose





Dyslipidemia 


- on Atorvstatin


- monitor AST/ALT





Peripheral neuropathy


- From diabetes


- Continue diabetic management


- Gabapentin prn for neuralgia





History of Obesity


- encourage calorie restriction


- prn position changes





LLE BKA


- PT/OT





DVT ppx


- lovenox 40





Clinical Quality Measures


DVT/VTE Risk/Contraindication:


Contraindications-Mechi:  Other *list below*


Other:  


infected ulcer











NICOLE VILA DO                Jan 14, 2023 07:42

## 2023-01-15 NOTE — PROGRESS NOTE - HOSPITALIST
Subjective


HPI/CC On Admission


Date Seen by Provider:  Darrion 15, 2023


Time Seen by Provider:  10:00


Aj Gary is a 44 year old male with history of venous insufficiency, 

diabetes, history of cardiac catheterization, and diabetic neuropathy who 

presented to the ED after request from wound care. He follows with wound care 

weekly for diabetic foot ulcers. Between visits this past week, he noticed that 

his blood sugar levels were difficult to control. Upon subsequent encounter with

wound care he was directed to go to the ED for further evaluation. He was seen 

and evaluated by surgery, and underwent right 5th digit amputation and 

debridement. Medicine team is following peripherally for post operative medical 

management.


Subjective/Events-last exam


No major events


IV antibiotics tolerated


Labs reviewed





Objective


Exam


Vital Signs





Vital Signs








  Date Time  Temp Pulse Resp B/P (MAP) Pulse Ox O2 Delivery O2 Flow Rate FiO2


 


1/15/23 11:18 36.8 81 18 104/65 (78) 95 Room Air  


 


1/11/23 21:41        21


 


1/11/23 20:40       10.00 





Capillary Refill : Less Than 3 Seconds


General Appearance:  No Apparent Distress, WD/WN, Chronically ill


Respiratory:  Lungs Clear, Normal Breath Sounds


Cardiovascular:  Regular Rate, Rhythm


Neurologic/Psychiatric:  Alert, Oriented x3, No Motor/Sensory Deficits, Normal 

Mood/Affect





Results/Procedures


Lab


Laboratory Tests


1/15/23 04:00








Patient resulted labs reviewed.





Assessment/Plan


Assessment and Plan


Assess & Plan/Chief Complaint


Diabetic foot ulcer complicated by necrotizing infection


- Foot x ray suspicious for osteomyelitis involving fifth met head, with 

superimposed fracture of fifth proximal phalanx and subcutaneous air overlying 

the fifth metatarsophalangeal joint.


- Right fifth digit and fifth metatarsal head amputation - POD 2


- Post-operative management by Surgery


- Longstanding history of diabetes


- Follow with wound care after d/c


- WBC stable


- on Vanc


- Blood cx: NGTD


- UTD recommendations: Vanc and 3rd gen cephalosporin for 4-6 weeks after non-

hemotogenous osteomyelitis debridement with 86% success rate. Oral may be used 

for up to 12 weeks if bacteria is cultured and susceptible to highly orally 

available antibiotics.


- consider inflammatory markers toward end of treatment course for confirmed 

resolution. 





Anemia


- following recent surgery


- without platelet, or WBC aberration


- Hgb improving


- will monitor





HTN


- continue home medications





Diabetes mellitus


- continue home medicine


- on ISS


- monitor glucose





Dyslipidemia 


- on Atorvstatin


- monitor AST/ALT





Peripheral neuropathy


- From diabetes


- Continue diabetic management


- Gabapentin prn for neuralgia





History of Obesity


- encourage calorie restriction


- prn position changes





LLE BKA


- PT/OT





DVT ppx


- lovenox 40





Clinical Quality Measures


DVT/VTE Risk/Contraindication:


Contraindications-Mechi:  Other *list below*


Other:  


infected ulcer











NICOLE VILA DO                Darrion 15, 2023 07:10

## 2023-01-16 NOTE — PROGRESS NOTE
Subjective


Subjective/Events-last exam


Patient states that he is feeling alot better. Denies any pain do to DM 

neuropathy. Tolerating PO diet


Review of Systems


Pulmonary:  No Dyspnea, No Cough


Cardiovascular:  Edema; No: Chest Pain, Palpitations


Neurological:  Weakness, Incoordination





Objective


Exam


Last Set of Vital Signs





Vital Signs








  Date Time  Temp Pulse Resp B/P (MAP) Pulse Ox O2 Delivery O2 Flow Rate FiO2


 


1/16/23 16:18 36.1 95 18 124/72 (89) 99 Room Air  


 


1/11/23 21:41        21


 


1/11/23 20:40       10.00 





Capillary Refill : Less Than 3 Seconds


I&O











Intake and Output 


 


 1/16/23





 00:00


 


Intake Total 2990 ml


 


Balance 2990 ml


 


 


 


Intake Oral 2990 ml


 


# Voids 10


 


# Bowel Movements 1








General:  Alert, Oriented X3, No Acute Distress


Lungs:  Clear to Auscultation, Normal Air Movement


Heart:  Regular Rate, No Murmurs


Abdomen:  Normal Bowel Sounds, Soft, No Tenderness, No Masses


Extremities:  Other (Swelling and erythema improving since amputation)


Neuro:  Normal Speech





Results/Procedures


Lab


Laboratory Tests


1/15/23 20:01: Glucometer 155H


1/16/23 05:37: Glucometer 84


1/16/23 06:10: 


White Blood Count 8.9, Red Blood Count 3.86L, Hemoglobin 10.5L, Hematocrit 34L, 

Mean Corpuscular Volume 88, Mean Corpuscular Hemoglobin 27, Mean Corpuscular 

Hemoglobin Concent 31L, Red Cell Distribution Width 15.5H, Platelet Count 350, 

Mean Platelet Volume 8.6L, Immature Granulocyte % (Auto) 4, Neutrophils (%) 

(Auto) 57, Lymphocytes (%) (Auto) 30, Monocytes (%) (Auto) 5, Eosinophils (%) 

(Auto) 3, Basophils (%) (Auto) 1, Neutrophils # (Auto) 5.0, Lymphocytes # (Auto)

2.7, Monocytes # (Auto) 0.5, Eosinophils # (Auto) 0.3, Basophils # (Auto) 0.1, 

Immature Granulocyte # (Auto) 0.4H, Sodium Level 138, Potassium Level 4.7, 

Chloride Level 106, Carbon Dioxide Level 22, Anion Gap 10, Blood Urea Nitrogen 

17, Creatinine 1.06, Estimat Glomerular Filtration Rate 89, BUN/Creatinine Ratio

16, Glucose Level 98, Calcium Level 9.1, Corrected Calcium 9.7, Total Bilirubin 

0.3, Aspartate Amino Transf (AST/SGOT) 51H, Alanine Aminotransferase (ALT/SGPT) 

66H, Alkaline Phosphatase 65, Total Protein 7.1, Albumin 3.3


1/16/23 10:45: Glucometer 148H


1/16/23 15:53: Glucometer 127H





Microbiology


1/11/23 Blood Culture - Preliminary, Resulted


          No growth





Assessment/Plan


Assessment/Plan





(1) Diabetic ulcer of right foot


Status:  Acute


Assessment & Plan:  1/16: s/p amputation, discussed case with Dr Agrawal and will

send patient with PO Augmentin, will have f.u with General surgery, Plan to d.c 

tomorrow with 


Qualifiers:  


   Qualified Codes:  E11.621 - Type 2 diabetes mellitus with foot ulcer; L97.518

- Non-pressure chronic ulcer of other part of right foot with other specified 

severity


(2) Diabetic foot ulcer


Status:  Chronic


Qualifiers:  


   


(3) Peripheral neuropathy


Status:  Chronic


Qualifiers:  


   Qualified Codes:  G62.9 - Polyneuropathy, unspecified


(4) Hypertension


Status:  Chronic


Assessment & Plan:  1/16: Continue home meds


Qualifiers:  


   Qualified Codes:  I10 - Essential (primary) hypertension


(5) Status post below-knee amputation of left lower extremity


Status:  Acute





Clinical Quality Measures


DVT/VTE Risk/Contraindication:


Contraindications-Mechi:  Other *list below*


Other:  


infected ulcer











ZULMA PEREZ MD               Jan 16, 2023 16:51

## 2023-01-17 NOTE — DISCHARGE SUMMARY
Discharge Summary


Instructions for Patient


[facility], [phone]


Assessment/Instructions


DM foot ulcer Left foot


DM foot wound


DM neuropathy


HTN


BMI 65


s/p Left BKA chronic


Physician to follow Patient:  MARIKA


Discharge Diet for Home:  ADA Diet


Hospital Course


Date of Admission: Jan 11, 2023 at 22:37 


Admission Diagnosis :  





Family Physician/Provider: [facility]  





Date of Discharge: 1/17/23 


Discharge Diagnosis: 


DM foot Ulcer left foot


DM foot wound


DM neuropathy


HTN


BMI 65


s/p Left BKA chronic








Labs and Pending Lab Test:


Laboratory Tests


1/16/23 15:53: Glucometer 127H


1/16/23 20:44: Glucometer 90


1/17/23 05:39: Glucometer 125H


1/17/23 05:50: 


White Blood Count 9.4, Red Blood Count 3.90L, Hemoglobin 10.7L, Hematocrit 34L, 

Mean Corpuscular Volume 87, Mean Corpuscular Hemoglobin 27, Mean Corpuscular 

Hemoglobin Concent 32, Red Cell Distribution Width 15.8H, Platelet Count 345, 

Mean Platelet Volume 8.8L, Immature Granulocyte % (Auto) 3, Neutrophils (%) 

(Auto) 60, Lymphocytes (%) (Auto) 29, Monocytes (%) (Auto) 5, Eosinophils (%) (

Auto) 2, Basophils (%) (Auto) 0, Neutrophils # (Auto) 5.6, Lymphocytes # (Auto) 

2.7, Monocytes # (Auto) 0.5, Eosinophils # (Auto) 0.2, Basophils # (Auto) 0.0, 

Immature Granulocyte # (Auto) 0.3H, Sodium Level 137, Potassium Level 4.7, C

hloride Level 106, Carbon Dioxide Level 21, Anion Gap 10, Blood Urea Nitrogen 

19H, Creatinine 1.01, Estimat Glomerular Filtration Rate 94, BUN/Creatinine 

Ratio 19, Glucose Level 127H, Calcium Level 9.1, Corrected Calcium 9.6, Total 

Bilirubin 0.2, Aspartate Amino Transf (AST/SGOT) 51H, Alanine Aminotransferase 

(ALT/SGPT) 72H, Alkaline Phosphatase 64, Total Protein 7.1, Albumin 3.4


1/17/23 11:29: Glucometer 156H





Microbiology


1/11/23 Blood Culture - Preliminary, Resulted


          No growth





Home Meds


Active


Reported


Ozempic (Semaglutide) 0.25 Mg/0.2 Ml Pen.injctr 0.5 Mg SQ WED


Synjardy Xr 12.5-1,000 mg Tab (Empagliflozin/Metformin HCl) 12.5 Mg-1,000 Mg 

Tab.bp.24h 2 Each PO DAILY


Lantus Solostar (Insulin Glargine,Hum.rec.anlog) 100 Unit/Ml (3 Ml) Insuln.pen 

50 Unit SQ BID


Calcium Carbonate 500 Mg Calcium (1250 Mg) Tablet 500-1,000 Mg PO BID PRN


Novolog Flexpen (Insulin Aspart) 100 Unit/Ml (3 Ml) Solution Units SQ 

SLIDING/SCALE PRN


Novolog Flexpen (Insulin Aspart) 100 Unit/Ml (3 Ml) Solution 40 Units SQ AC


Linezolid 600 Mg Tablet 600 Mg PO BID


     FILLED 01- #28/14 DAY SUPPLY


Atorvastatin Calcium 80 Mg Tablet 80 Mg PO DAILY


Prasugrel HCl 10 Mg Tablet 10 Mg PO DAILY


Aspirin EC (Aspirin) 81 Mg Tablet.dr 81 Mg PO DAILY


Lisinopril-Hctz 20-25 mg Tab (Lisinopril/Hydrochlorothiazide) 20 Mg-25 Mg Tablet

2 Each PO DAILY


Patient Allergies:  


Coded Allergies:  


     levofloxacin (Verified  Allergy, Mild, 4/14/15)


Height (Feet):  6


Height (Inches):  0.00


Weight (Pounds):  422


Weight (Ounces):  6.4


New Medications:  


Amlodipine Besylate (Amlodipine Besylate) 5 Mg Tablet


5 MG PO DAILY, #30 TAB





Amoxicillin/Potassium Clav (Amox Tr-K Clv 875-125 mg Tab) 875 Mg-125 Mg Tablet


875 MG PO BID WITH MEALS, #28 TAB





 


Continued Medications:  


Aspirin (Aspirin EC) 81 Mg Tablet.dr


81 MG PO DAILY, TAB





Atorvastatin Calcium (Atorvastatin Calcium) 80 Mg Tablet


80 MG PO DAILY, TAB





Calcium Carbonate (Calcium Carbonate) 500 Mg Calcium (1250 Mg) Tablet


500-1000 MG PO BID PRN for INDIGESTION, TAB





Empagliflozin/Metformin HCl (Synjardy Xr 12.5-1,000 mg Tab) 12.5 Mg-1,000 Mg 

Tab.bp.24h


2 EACH PO DAILY





Insulin Aspart (Novolog Flexpen) 100 Unit/Ml (3 Ml) Solution


40 UNITS SQ AC, EA





Insulin Aspart (Novolog Flexpen) 100 Unit/Ml (3 Ml) Solution


UNITS SQ SLIDING/SCALE PRN for HYPERGLYCEMIA, EA





Insulin Glargine,Hum.rec.anlog (Lantus Solostar) 100 Unit/Ml (3 Ml) Insuln.pen


50 UNIT SQ BID, EA





Lisinopril/Hydrochlorothiazide (Lisinopril-Hctz 20-25 mg Tab) 20 Mg-25 Mg Tablet


2 EACH PO DAILY, TAB





Prasugrel HCl (Prasugrel HCl) 10 Mg Tablet


10 MG PO DAILY, TAB





Semaglutide (Ozempic) 0.25 Mg/0.2 Ml Pen.injctr


0.5 MG SQ WED, EA





 


Discontinued Medications:  


Linezolid (Linezolid) 600 Mg Tablet


600 MG PO BID, TAB


FILLED 01- #28/14 DAY SUPPLY








Home Health Need/Face to Face


Date of Face to Face:  Jan 17, 2023


Clinical Findings:  Generalized weakness and fatigue, Unsteady gait, Wound 

infection, Non-healing wound


I have seen Pt face-to-face:  Yes


Discharged To:  Home


Diagnosis/Conditions:  


See above


Patient is Homebound due to:  Arline fall risk due to instabilty


Homebound Status


   Due to the above stated illness, injury or surgical procedure (medical 

condition or diagnosis) and associated clinical findings, the patient is 

homebound because of his/her inability to leave home except with aid of a 

supportive device and/or person AND leaving the home requires a considerable and

taxing effort or is medically contraindicated.


Pt req the following assistanc:  Wheelchair





Home Health Nursing Orders


Home Health Services Order:  Nursing Services, Physical Therapy-Evaluate & 

Treat, Wound Care-Eval/Treat





Home Health Infusion Therapy


Line Start Date:  Jan 11, 2023





Therapy Orders


Therapy Orders:  PT to assess for OT


Therapy Specific Orders:  Eval assistive deivces, Increase strength/endurance


Certify Stmt


I certify that this patient is under my care and that I, a nurse practitioner or

a physician; a assistant working with me, had a face to face encounter that -

meets the physician face to face encounter requirements with this patient as 

dated.





Discharge Physical Exam


General:  Alert, Oriented X3, Cooperative, No Acute Distress


Lungs:  Clear to Auscultation, Normal Air Movement


Heart:  Regular Rate, No Murmurs


Abdomen:  Normal Bowel Sounds, Soft


Extremities:  Other (L BKA, Right foot with compression bandage present, mild 

oozing serosag fluid)


Skin:  Other (1+ edema RLE)


Neuro:  Normal Speech











ZULMA PEREZ MD               Jan 17, 2023 11:52

## 2023-08-03 NOTE — DIAGNOSTIC IMAGING REPORT
EXAMINATION: 

Right foot, 3 views.



HISTORY: 

Foot ulcer.



COMPARISON: 

04/22/2023.



FINDINGS: 

There has been a transmetatarsal amputation of the right fifth

toe. There is irregularity of the remaining metatarsal. The

previously seen erosions have improved. No new erosions are seen.

There is moderate second metatarsophalangeal osteophytosis. No

acute fracture.



IMPRESSION:

Improved erosions of the residual right fifth metatarsal status

post amputation of the fifth toe. No new erosions.



Dictated by: 



  Dictated on workstation # [id number]

## 2025-01-16 NOTE — PROGRESS NOTE - SURGERY
MEENAKSHI CHILEL 1/16/23 0756:


Subjective


Date Seen by a Provider:  Jan 16, 2023


Time Seen by a Provider:  07:51


Subjective/Events-last exam


Pt resting comfortably in bed s/p day 5 or Rt 5th digit amputation. Pt reports 

no rt foot pain at this time. Wound is covered with dressing with no surrounding

erythema or edema. Per med notes, mild sero-sanguineous discharge with 

appropriate wound healing on last drerssing change. Denies any fever, chills, 

n/v, CP, or SOB. Pt is able to void and BMs w/o complaints. tolerating diet 

well.


Review of Systems


General:  No Chills, No Night Sweats


HEENT:  No Head Aches, No Dysphasia


Pulmonary:  No Dyspnea, No Cough


Cardiovascular:  No: Chest Pain, Palpitations


Gastrointestinal:  No: Nausea, Vomiting, Abdominal Pain


Genitourinary:  No Dysuria, No Incontinence


Musculoskeletal:  No: leg pain, foot pain


Neurological:  No: Weakness, Numbness





Objective


Exam





Vital Signs








  Date Time  Temp Pulse Resp B/P (MAP) Pulse Ox O2 Delivery O2 Flow Rate FiO2


 


1/16/23 07:17 36.0 86 16 164/84 (110) 97 Room Air  


 


1/16/23 03:59 36.3 75 16 152/69 (96) 97 Room Air  


 


1/15/23 23:36 36.2 74 16 132/67 (88) 96 Room Air  


 


1/15/23 23:35     97 Room Air  


 


1/15/23 20:00      Room Air  


 


1/15/23 19:46 36.3 85 18 134/63 (86) 97 Room Air  


 


1/15/23 16:00 36.3 91 20 110/62 (78) 98 Room Air  


 


1/15/23 11:18 36.8 81 18 104/65 (78) 95 Room Air  


 


1/15/23 08:00      Room Air  














I & O 


 


 1/16/23





 07:00


 


Intake Total 2690 ml


 


Balance 2690 ml





Capillary Refill : Less Than 3 Seconds


General Appearance:  No Apparent Distress, WD/WN


HEENT:  PERRL/EOMI, Normal ENT Inspection


Neck:  Normal Inspection, Supple


Respiratory:  Lungs Clear, Normal Breath Sounds, No Accessory Muscle Use, No 

Respiratory Distress


Cardiovascular:  Regular Rate, Rhythm, No Murmur, Normal Peripheral Pulses


Peripheral Pulses:  2+ Left Dors-Pedis (L), 2+ Radial Pulses (R), 2+ Radial 

Pulses (L)


Gastrointestinal:  normal bowel sounds, non tender, soft, no organomegaly, no 

pulsatile mass


Extremity:  No Calf Tenderness, Other (right lateral foot incision with no 

drainage on dressing.  Incision with no signs of infection.)


Neurologic/Psychiatric:  Alert, Oriented x3, No Motor/Sensory Deficits, Normal 

Mood/Affect


Skin:  Normal Color, Warm/Dry


Lymphatic:  No Adenopathy





Results


Lab


Laboratory Tests


1/15/23 11:20: Glucometer 199H


1/15/23 15:52: Glucometer 139H


1/15/23 20:01: Glucometer 155H


1/16/23 05:37: Glucometer 84


1/16/23 06:10: 


White Blood Count 8.9, Red Blood Count 3.86L, Hemoglobin 10.5L, Hematocrit 34L, 

Mean Corpuscular Volume 88, Mean Corpuscular Hemoglobin 27, Mean Corpuscular 

Hemoglobin Concent 31L, Red Cell Distribution Width 15.5H, Platelet Count 350, 

Mean Platelet Volume 8.6L, Immature Granulocyte % (Auto) 4, Neutrophils (%) 

(Auto) 57, Lymphocytes (%) (Auto) 30, Monocytes (%) (Auto) 5, Eosinophils (%) 

(Auto) 3, Basophils (%) (Auto) 1, Neutrophils # (Auto) 5.0, Lymphocytes # (Auto)

2.7, Monocytes # (Auto) 0.5, Eosinophils # (Auto) 0.3, Basophils # (Auto) 0.1, 

Immature Granulocyte # (Auto) 0.4H, Sodium Level 138, Potassium Level 4.7, 

Chloride Level 106, Carbon Dioxide Level 22, Anion Gap 10, Blood Urea Nitrogen 

17, Creatinine 1.06, Estimat Glomerular Filtration Rate 89, BUN/Creatinine Ratio

16, Glucose Level 98, Calcium Level 9.1, Corrected Calcium 9.7, Total Bilirubin 

0.3, Aspartate Amino Transf (AST/SGOT) 51H, Alanine Aminotransferase (ALT/SGPT) 

66H, Alkaline Phosphatase 65, Total Protein 7.1, Albumin 3.3





Microbiology


1/11/23 Blood Culture - Preliminary, Resulted


          No growth





Assessment/Plan


A 44 year old male with right foot osteomyelitis


plantar foot ulcer with concern for necrotizing fasciitis


S/p right fifth digit and fifth metatarsal head (5th partial ray amputation) 

amputation - POD 5


morbid obesity


DM insulin dependent


Long term antiplatelet








Wound healing well. No bleeding over night. Mild discharge on dressing, dressing

removed and redressed. good pain control  


Regular diet


Continue IV ABX therapy  


Pain control 


Physical Therapy


Plans to DC tomorrow and f/u with wound care out pt





Clinical Quality Measures


DVT/VTE Risk/Contraindication:


Contraindications-Mechi:  Other *list below*


Other:  


infected ulcer





BRYAN AGRAWAL DO 1/16/23 2020:


Subjective


Subjective/Events-last exam


Patient doing well.  Pain controlled. Tolerating dressing changes.  Not having 

any significant bleeding from wound.  Denies new complaints. Denies n/v fever 

sweats chills shortness of breath or chest pain.





Objective


Exam


General Appearance:  No Apparent Distress, Obese


HEENT:  PERRL/EOMI, Normal ENT Inspection


Neck:  Normal Inspection, Supple


Respiratory:  Chest Non Tender, No Accessory Muscle Use, No Respiratory Distress


Cardiovascular:  Regular Rate, Rhythm, No JVD


Gastrointestinal:  non tender, soft


Extremity:  Other (right lateral foot incision with no drainage slowly healing  

no signs of infection, left bka)


Neurologic/Psychiatric:  Alert, Oriented x3


Skin:  Normal Color, Warm/Dry


Lymphatic:  No Adenopathy





Assessment/Plan


 right foot osteomyelitis


plantar foot ulcer with concern for necrotizing fasciitis


S/p right fifth digit and fifth metatarsal head (5th partial ray amputation) 

amputation


morbid obesity


DM insulin dependent


Long term antiplatelet








Wound healing well but slowly.


will need daily wound care and possible vac in near future 


Regular diet


Continue IV ABX therapy convert to augmentin  


Pain control 


Physical Therapy


Plans to DC tomorrow and f/u with wound care out pt





Supervisory-Addendum Brief


Verification & Attestation


Participated in pt care:  history, MDM, physical


Personally performed:  exam, history, MDM, supervision of care


Care discussed with:  Medical Student


Procedures:  n/a


Results interpretation:  Verified all documentation


Verification and Attestation of Medical Student E/M Service





A medical student performed and documented this service in my presence. I 

reviewed and verified all information documented by the medical student and made

modifications to such information, when appropriate. I personally performed the 

physical exam and medical decision making. 





 Bryan Agrawal, Jan 16, 2023,20:20











MEENAKSHI CHILEL                  Jan 16, 2023 07:56


BRYAN AGRAWAL DO              Jan 16, 2023 20:20
SANDRA HUMPHREYS 1/12/23 1213:


Subjective


Date Seen by a Provider:  Jan 12, 2023


Time Seen by a Provider:  08:15


Subjective/Events-last exam


Pt resting comfortably in bed. States his pain is under control at this time. 

The dressing was bloody upon entering room. States he was moving around to 

urinate and must have bumped it, as he did not have any bleeding over night. He 

did not eat anything over night and has not yet had a bowel movement. Denies any

urinary sx, fever, chills, N/V, chest pain or shortness of breath.


Review of Systems


General:  No Chills, No Night Sweats


HEENT:  No Head Aches


Pulmonary:  No Dyspnea, No Cough


Cardiovascular:  No: Chest Pain


Gastrointestinal:  No: Nausea, Vomiting, Abdominal Pain, Diarrhea, Constipation


Genitourinary:  No Dysuria, No Frequency


Musculoskeletal:  foot pain (right foot dressed but non painful); No: neck pain,

leg pain


Neurological:  No: Weakness, Numbness





Focused Exam


Lactate Level


1/11/23 17:10: Lactic Acid Level 1.33





Objective


Exam





Vital Signs








  Date Time  Temp Pulse Resp B/P (MAP) Pulse Ox O2 Delivery O2 Flow Rate FiO2


 


1/12/23 11:18     97 Room Air  


 


1/12/23 08:00 36.5 77 18 114/73 (87) 97 Room Air  


 


1/12/23 03:34 36.2 78 20 114/70 (85) 97 Room Air  


 


1/11/23 23:53 36.6 81 20 112/71 (85) 97 Room Air  


 


1/11/23 21:41 35.8 92   95   21


 


1/11/23 21:35      Room Air  


 


1/11/23 21:35 36.2 85 18 115/46 (69)  Room Air  


 


1/11/23 21:10      Room Air  


 


1/11/23 21:00 36.8  21 105/63 (77) 97 Room Air  


 


1/11/23 21:00      Room Air  


 


1/11/23 20:50   14 103/61 (75) 100 Room Air  


 


1/11/23 20:45      Room Air  


 


1/11/23 20:40   18 103/56 (72) 100 OxyMask 10.00 


 


1/11/23 20:35   16 101/57 (72) 99 OxyMask 10.00 


 


1/11/23 20:29 37.1  20 91/48 (62) 100 OxyMask 10.00 


 


1/11/23 20:29      OxyMask 10.00 


 


1/11/23 19:19  92 20 111/77 95 Room Air  


 


1/11/23 16:30 35.8 92 20 129/82 (98) 95 Room Air  














I & O 


 


 1/12/23





 07:00


 


Intake Total 1300 ml


 


Output Total 900 ml


 


Balance 400 ml





Capillary Refill : Less Than 3 Seconds


General Appearance:  No Apparent Distress, WD/WN, Obese


HEENT:  PERRL/EOMI, Moist Mucous Membranes


Neck:  Non Tender, Supple


Respiratory:  Chest Non Tender, No Accessory Muscle Use, No Respiratory Distress


Cardiovascular:  Regular Rate, Rhythm, No JVD


Peripheral Pulses:  2+ Radial Pulses (R), 2+ Radial Pulses (L)


Gastrointestinal:  non tender, soft


Extremity:  Other (right foot dressing in place with active bleeding through 

dressing. Addressed bleeding in room with cautery, bleeding controlled. Wound 

healing well with no surrounding erythema or puruelent drainage. )


Neurologic/Psychiatric:  Alert, Oriented x3, Normal Mood/Affect


Skin:  Normal Color, Warm/Dry


Lymphatic:  No Adenopathy





Results


Lab


Laboratory Tests


1/11/23 17:10: 


White Blood Count 9.6, Red Blood Count 4.27L, Hemoglobin 11.5L, Hematocrit 37L, 

Mean Corpuscular Volume 86, Mean Corpuscular Hemoglobin 27, Mean Corpuscular 

Hemoglobin Concent 31L, Red Cell Distribution Width 15.3H, Platelet Count 479H, 

Mean Platelet Volume 9.0, Immature Granulocyte % (Auto) 2, Neutrophils (%) 

(Auto) 65, Lymphocytes (%) (Auto) 24, Monocytes (%) (Auto) 5, Eosinophils (%) 

(Auto) 3, Basophils (%) (Auto) 1, Neutrophils # (Auto) 6.2, Lymphocytes # (Auto)

2.4, Monocytes # (Auto) 0.5, Eosinophils # (Auto) 0.3, Basophils # (Auto) 0.1, 

Immature Granulocyte # (Auto) 0.2H, Sodium Level 136, Potassium Level 5.1H, 

Chloride Level 103, Carbon Dioxide Level 21, Anion Gap 12, Blood Urea Nitrogen 

34H, Creatinine 1.21, Estimat Glomerular Filtration Rate 76, BUN/Creatinine 

Ratio 28, Glucose Level 101, Lactic Acid Level 1.33, Calcium Level 9.2, 

Corrected Calcium 9.6, Total Bilirubin 0.2, Aspartate Amino Transf (AST/SGOT) 

23, Alanine Aminotransferase (ALT/SGPT) 32, Alkaline Phosphatase 83, C-Reactive 

Protein High Sensitivity 8.52H, Total Protein 8.1, Albumin 3.5


1/12/23 05:24: 


White Blood Count 7.7, Red Blood Count 3.47L, Hemoglobin 9.4L, Hematocrit 31L, 

Mean Corpuscular Volume 88, Mean Corpuscular Hemoglobin 27, Mean Corpuscular 

Hemoglobin Concent 31L, Red Cell Distribution Width 15.4H, Platelet Count 394, 

Mean Platelet Volume 9.0, Immature Granulocyte % (Auto) 3, Neutrophils (%) 

(Auto) 53, Lymphocytes (%) (Auto) 34, Monocytes (%) (Auto) 7, Eosinophils (%) 

(Auto) 3, Basophils (%) (Auto) 1, Neutrophils # (Auto) 4.1, Lymphocytes # (Auto)

2.6, Monocytes # (Auto) 0.5, Eosinophils # (Auto) 0.2, Basophils # (Auto) 0.0, 

Immature Granulocyte # (Auto) 0.3H, Sodium Level 137, Potassium Level 4.5, 

Chloride Level 108H, Carbon Dioxide Level 21, Anion Gap 8, Blood Urea Nitrogen 

30H, Creatinine 1.21, Estimat Glomerular Filtration Rate 76, BUN/Creatinine 

Ratio 25, Glucose Level 109H, Calcium Level 8.4L, Corrected Calcium 9.4, Total 

Bilirubin 0.2, Aspartate Amino Transf (AST/SGOT) 18, Alanine Aminotransferase 

(ALT/SGPT) 23, Alkaline Phosphatase 64, Total Protein 6.5, Albumin 2.8L, 

Glucometer 105


1/12/23 10:44: Glucometer 176H





Assessment/Plan


right foot osteomyelitis


plantar foot ulcer with concern for necrotizing fasciitis


S/p right fifth digit and fifth metatarsal head amputaion - POD 1


morbid obesity


DM insulin dependent


Long term antiplatelet








Pt tolerated amputation well. Upon dressing removal found an active bleeding 

vessel. Cauterized the vessel and bleeding stopped. Repack the wound and apply 

dressing. Pt pain under control. 


Regular diet


Continue Abx 


Pain control





Clinical Quality Measures


DVT/VTE Risk/Contraindication:


Contraindications-Mechi:  Other *list below*


Other:  


infected ulcer





BRYAN AGRAWAL DO 1/12/23 1653:


Subjective


Subjective/Events-last exam


Overnight did well.  Pain controlled. THis morning started having bleeding from 

right foot wound.  Denies n/v fever sweats chills shortness of breath or chest 

pain.





Objective


Exam


General Appearance:  No Apparent Distress, WD/WN, Obese


HEENT:  PERRL/EOMI, Moist Mucous Membranes


Neck:  Non Tender, Supple


Respiratory:  Chest Non Tender, No Accessory Muscle Use, No Respiratory Distress


Cardiovascular:  Regular Rate, Rhythm, No JVD


Gastrointestinal:  non tender, soft


Extremity:  Other (left bka,   right foot open wound with small arterial bleed, 

tissue clean no purulent drainage.)


Neurologic/Psychiatric:  Alert, Oriented x3, Normal Mood/Affect


Skin:  Normal Color, Warm/Dry


Lymphatic:  No Adenopathy





Assessment/Plan


right foot osteomyelitis


plantar foot ulcer with concern for necrotizing fasciitis


S/p right fifth digit and fifth metatarsal head (5th partial ray amputation) 

amputation - POD 1


morbid obesity


DM insulin dependent


Long term antiplatelet








Pt tolerated amputation well. Upon dressing removal found an active bleeding 

vessel. Cauterized the vessel and bleeding stopped. Repack the wound and apply 

dressing. Pt pain under control. 


Regular diet


Continue Abx 


Pain control 


Wound care





Supervisory-Addendum Brief


Verification & Attestation


Participated in pt care:  history, MDM, physical


Personally performed:  exam, history, MDM, supervision of care


Care discussed with:  Medical Student


Procedures:  n/a


Results interpretation:  Verified all documentation


Verification and Attestation of Medical Student E/M Service





A medical student performed and documented this service in my presence. I 

reviewed and verified all information documented by the medical student and made

modifications to such information, when appropriate. I personally performed the 

physical exam and medical decision making. 





 Bryan Agrawal, Jan 12, 2023,16:53











SANDRA HUMPHREYS                    Jan 12, 2023 12:13


BRYAN AGRAWAL DO              Jan 12, 2023 16:53
SANDRA HUMPHREYS 1/13/23 1041:


Subjective


Date Seen by a Provider:  Jan 13, 2023


Time Seen by a Provider:  09:30


Subjective/Events-last exam


Pt watching tv in no discomfort. States he has no pain. Dressing in place with 

mild serosanguineous discharge due to transfer from bed to bedside commode. 

Nurse reports he did not have any bleeding over night. States he is eating, 

urinating, and having normal bowel movements. He denies any fever, chills, N/V, 

chest pain or shortness of breath.


Review of Systems


General:  No Chills, No Night Sweats


HEENT:  No Head Aches, No Visual Changes


Pulmonary:  No Dyspnea, No Cough


Cardiovascular:  No: Chest Pain


Gastrointestinal:  No: Nausea, Vomiting, Abdominal Pain


Genitourinary:  No Dysuria, No Frequency


Musculoskeletal:  No: leg pain, foot pain (right great toe amputee )


Neurological:  No: Weakness, Numbness





Focused Exam


Lactate Level


1/11/23 17:10: Lactic Acid Level 1.33





Objective


Exam





Vital Signs








  Date Time  Temp Pulse Resp B/P (MAP) Pulse Ox O2 Delivery O2 Flow Rate FiO2


 


1/13/23 07:57 36.2 71 18 140/81 (100) 98 Room Air  


 


1/13/23 06:45     98 Room Air  


 


1/13/23 03:47 36.5 76 16 145/85 (105) 98 Room Air  


 


1/12/23 23:47 36.8 84 16 154/86 (108) 97 Room Air  


 


1/12/23 20:50      Room Air  


 


1/12/23 20:18 36.3 90 18 161/97 (118) 100 Room Air  


 


1/12/23 18:55     97 Room Air  


 


1/12/23 16:00 36.2 75 16 147/85 (105) 97 Room Air  


 


1/12/23 12:16 36.3 77 18 126/71 (89) 98 Room Air  


 


1/12/23 11:18     97 Room Air  














I & O 


 


 1/13/23





 07:00


 


Intake Total 3715 ml


 


Output Total 210 ml


 


Balance 3505 ml





Capillary Refill : Less Than 3 Seconds


General Appearance:  No Apparent Distress, WD/WN, Obese


HEENT:  PERRL/EOMI, Moist Mucous Membranes


Neck:  Non Tender, Supple


Respiratory:  Chest Non Tender, Lungs Clear, Normal Breath Sounds, No Accessory 

Muscle Use, No Respiratory Distress


Cardiovascular:  Regular Rate, Rhythm, No JVD, Normal Peripheral Pulses


Peripheral Pulses:  2+ Left Dors-Pedis (L), 2+ Radial Pulses (R), 2+ Radial 

Pulses (L)


Gastrointestinal:  normal bowel sounds, non tender, soft


Extremity:  Non Tender, No Calf Tenderness, Other (left bka,   right foot wound 

healing well with packing in place and no purulent discharge. No surrounding 

erythema. No pain. )


Neurologic/Psychiatric:  Alert, Oriented x3, Normal Mood/Affect


Skin:  Normal Color, Warm/Dry


Lymphatic:  No Adenopathy





Results


Lab


Laboratory Tests


1/12/23 10:44: Glucometer 176H


1/12/23 17:37: Glucometer 161H


1/12/23 20:48: Glucometer 143H


1/13/23 05:00: 


White Blood Count 5.8, Red Blood Count 3.62L, Hemoglobin 9.7L, Hematocrit 32L, 

Mean Corpuscular Volume 87, Mean Corpuscular Hemoglobin 27, Mean Corpuscular 

Hemoglobin Concent 31L, Red Cell Distribution Width 15.1H, Platelet Count 365, 

Mean Platelet Volume 8.9L, Immature Granulocyte % (Auto) 3, Neutrophils (%) 

(Auto) 47, Lymphocytes (%) (Auto) 40, Monocytes (%) (Auto) 6, Eosinophils (%) 

(Auto) 4, Basophils (%) (Auto) 1, Neutrophils # (Auto) 2.7, Lymphocytes # (Auto)

2.3, Monocytes # (Auto) 0.3, Eosinophils # (Auto) 0.2, Basophils # (Auto) 0.0, 

Immature Granulocyte # (Auto) 0.2H, Sodium Level 141, Potassium Level 4.7, 

Chloride Level 111H, Carbon Dioxide Level 20L, Anion Gap 10, Blood Urea Nitrogen

19H, Creatinine 1.01, Estimat Glomerular Filtration Rate 94, BUN/Creatinine 

Ratio 19, Glucose Level 106H, Calcium Level 8.8, Corrected Calcium 9.6, Total 

Bilirubin 0.2, Aspartate Amino Transf (AST/SGOT) 19, Alanine Aminotransferase 

(ALT/SGPT) 22, Alkaline Phosphatase 65, Total Protein 6.7, Albumin 3.0L, 

Vancomycin Level Trough 27.4*H


1/13/23 10:20: Glucometer 154H





Microbiology


1/11/23 Blood Culture - Preliminary, Resulted


          No growth





Assessment/Plan


right foot osteomyelitis


plantar foot ulcer with concern for necrotizing fasciitis


S/p right fifth digit and fifth metatarsal head (5th partial ray amputation) amp

utation - POD 2


morbid obesity


DM insulin dependent


Long term antiplatelet








Wound healing well. No bleeding over night. Mild oozing due to transfer, repack 

the wound tight and apply dressing. Pain under control


Discussed appropriate wound care with pt.   


Regular diet


Continue Abx 


Pain control 


Physical Therapy





Clinical Quality Measures


DVT/VTE Risk/Contraindication:


Contraindications-Mechi:  Other *list below*


Other:  


infected ulcer





BRYAN AGRAWAL DO 1/13/23 1543:


Subjective


Subjective/Events-last exam


Patient pain controlled. Had some oozing from bandage.  Denies new complaints. 

Deneis n/v fever sweats chills shortness of breath or chest pain.





Objective


Exam


General Appearance:  No Apparent Distress, WD/WN, Obese


HEENT:  PERRL/EOMI, Normal ENT Inspection


Neck:  Non Tender, Supple


Respiratory:  Chest Non Tender, No Accessory Muscle Use, No Respiratory Distress


Cardiovascular:  Regular Rate, Rhythm, No JVD


Gastrointestinal:  non tender, soft


Extremity:  Non Tender, No Calf Tenderness, Other (left bka,   right foot wound 

healing well with packing in place and no purulent discharge. No surrounding 

erythema. No pain. No active bleeding)


Neurologic/Psychiatric:  Alert, Oriented x3


Skin:  Normal Color, Warm/Dry


Lymphatic:  No Adenopathy





Assessment/Plan


right foot osteomyelitis


plantar foot ulcer with concern for necrotizing fasciitis


S/p right fifth digit and fifth metatarsal head (5th partial ray amputation) 

amputation - POD 2


morbid obesity


DM insulin dependent


Long term antiplatelet








Wound healing well. No bleeding over night. Mild oozing due to transfer, repack 

the wound tight and apply dressing. Pain under control


Discussed appropriate wound care with pt.   


Regular diet


Continue Abx 


Pain control 


Physical Therapy





Supervisory-Addendum Brief


Verification & Attestation


Participated in pt care:  history, MDM, physical


Personally performed:  exam, history, MDM, supervision of care


Care discussed with:  Medical Student


Procedures:  n/a


Results interpretation:  Verified all documentation


Verification and Attestation of Medical Student E/M Service





A medical student performed and documented this service in my presence. I 

reviewed and verified all information documented by the medical student and made

modifications to such information, when appropriate. I personally performed the 

physical exam and medical decision making. 





 Bryan Agrawal, Jan 13, 2023,15:43











SANDRA HUMPHREYS                    Jan 13, 2023 10:41


BRYAN AGRAWAL DO              Jan 13, 2023 15:43
SANDRA HUMPHREYS 1/17/23 0707:


Subjective


Date Seen by a Provider:  Jan 17, 2023


Time Seen by a Provider:  06:50


Subjective/Events-last exam


Pt resting comfortably. Wound wrapped with minimal blood noted. States over 

night they had to re-dress his wound due to some mild bleeding after 

transferring, but otherwise did not have any bleeding over night. He was 

switched to augmentin yesterday, tolerating well. Reports he has minimal pain 

secondary to his chronic neuropathy. He is eating and drinking without 

difficulty. Denies fever, chills, N/V, sweats, chest pain, or shortness of 

breath.


Review of Systems


General:  No Chills, No Night Sweats


HEENT:  No Head Aches, No Visual Changes


Pulmonary:  No Cough


Gastrointestinal:  No: Nausea, Vomiting, Abdominal Pain


Genitourinary:  No Dysuria, No Frequency


Musculoskeletal:  foot pain (Little to no pain. Right fifth toe amputation. ); 

No: other, neck pain


Neurological:  No: Weakness, Numbness





Objective


Exam





Vital Signs








  Date Time  Temp Pulse Resp B/P (MAP) Pulse Ox O2 Delivery O2 Flow Rate FiO2


 


1/17/23 03:48 35.8 78 16 121/70 (87) 97 Room Air  


 


1/16/23 23:03 35.9 89 16 137/66 (89) 98 Room Air  


 


1/16/23 20:59      Room Air  


 


1/16/23 19:47 36.3 84 16 137/88 (104) 100 Room Air  


 


1/16/23 16:18 36.1 95 18 124/72 (89) 99 Room Air  


 


1/16/23 12:00 36.0 83 22 116/62 (80) 96 Room Air  


 


1/16/23 08:00      Room Air  


 


1/16/23 07:17 36.0 86 16 164/84 (110) 97 Room Air  














I & O 


 


 1/17/23





 07:00


 


Intake Total 2380 ml


 


Balance 2380 ml





Capillary Refill : Less Than 3 Seconds


General Appearance:  No Apparent Distress, Obese


HEENT:  PERRL/EOMI, Moist Mucous Membranes


Neck:  Non Tender, Supple


Respiratory:  Chest Non Tender, No Accessory Muscle Use, No Respiratory Distress


Cardiovascular:  Regular Rate, Rhythm, No JVD


Peripheral Pulses:  2+ Left Dors-Pedis (L), 2+ Radial Pulses (R), 2+ Radial 

Pulses (L)


Gastrointestinal:  non tender, soft


Extremity:  No Calf Tenderness, Other (right lateral foot incision with no 

drainage slowly healing  no signs of infection, minimal oozing from site. Left 

BKA)


Neurologic/Psychiatric:  Alert, Oriented x3


Skin:  Normal Color, Warm/Dry


Lymphatic:  No Adenopathy





Results


Lab


Laboratory Tests


1/16/23 10:45: Glucometer 148H


1/16/23 15:53: Glucometer 127H


1/16/23 20:44: Glucometer 90


1/17/23 05:39: Glucometer 125H


1/17/23 05:50: 


White Blood Count 9.4, Red Blood Count 3.90L, Hemoglobin 10.7L, Hematocrit 34L, 

Mean Corpuscular Volume 87, Mean Corpuscular Hemoglobin 27, Mean Corpuscular 

Hemoglobin Concent 32, Red Cell Distribution Width 15.8H, Platelet Count 345, 

Mean Platelet Volume 8.8L, Immature Granulocyte % (Auto) 3, Neutrophils (%) 

(Auto) 60, Lymphocytes (%) (Auto) 29, Monocytes (%) (Auto) 5, Eosinophils (%) 

(Auto) 2, Basophils (%) (Auto) 0, Neutrophils # (Auto) 5.6, Lymphocytes # (Auto)

2.7, Monocytes # (Auto) 0.5, Eosinophils # (Auto) 0.2, Basophils # (Auto) 0.0, 

Immature Granulocyte # (Auto) 0.3H, Sodium Level 137, Potassium Level 4.7, Chlo

ride Level 106, Carbon Dioxide Level 21, Anion Gap 10, Blood Urea Nitrogen 19H, 

Creatinine 1.01, Estimat Glomerular Filtration Rate 94, BUN/Creatinine Ratio 19,

Glucose Level 127H, Calcium Level 9.1, Corrected Calcium 9.6, Total Bilirubin 

0.2, Aspartate Amino Transf (AST/SGOT) 51H, Alanine Aminotransferase (ALT/SGPT) 

72H, Alkaline Phosphatase 64, Total Protein 7.1, Albumin 3.4





Microbiology


1/11/23 Blood Culture - Preliminary, Resulted


          No growth





Assessment/Plan


right foot osteomyelitis


plantar foot ulcer with concern for necrotizing fasciitis


S/p right fifth digit and fifth metatarsal head (5th partial ray amputation) 

amputation - POD 6


morbid obesity


DM insulin dependent


Long term antiplatelet








Wound healing well but slowly. Occasional oozing with pressure, counseled pt on 

how to apply dressing. 


Discussed will need daily wound care and possible vac in near future 


Regular diet


PO abx - augmentin  


Pain control 


Physical Therapy


Plan to discharge today and f/u with wound care out pt


Pt is agreeable with plan.





Clinical Quality Measures


DVT/VTE Risk/Contraindication:


Contraindications-Mechi:  Other *list below*


Other:  


infected ulcer





BRYAN AGRAWAL DO 1/17/23 2128:


Subjective


Subjective/Events-last exam


Patient doing well.  Minimal bloody ooze from wound on right foot.  Pain 

controlled.  No new complaints.  Denies n/v fever sweats chills shortness of 

breath or chest pain.





Objective


Exam


General Appearance:  No Apparent Distress, Obese


HEENT:  PERRL/EOMI, Normal ENT Inspection


Neck:  Non Tender, Supple


Respiratory:  Chest Non Tender, No Accessory Muscle Use, No Respiratory Distress


Cardiovascular:  Regular Rate, Rhythm, No JVD


Gastrointestinal:  non tender, soft


Extremity:  Other (right lateral foot wound with no drainage slowly healing  no 

signs of infection, minimal bloody oozing from site. Left BKA)


Neurologic/Psychiatric:  Alert, Oriented x3


Skin:  Normal Color, Warm/Dry


Lymphatic:  No Adenopathy





Assessment/Plan


right foot osteomyelitis


plantar foot ulcer with concern for necrotizing fasciitis


S/p right fifth digit and fifth metatarsal head (5th partial ray amputation) 

amputation 


morbid obesity


DM insulin dependent


Long term antiplatelet








Wound healing well but slowly. Occasional oozing with pressure, counseled pt on 

how to apply dressing. 


Discussed will need daily wound care and possible vac in near future 


Regular diet


PO abx - augmentin  


Pain control 


Physical Therapy


Plan to discharge today and f/u with wound care out pt


Pt is agreeable with plan.





Supervisory-Addendum Brief


Verification & Attestation


Participated in pt care:  history, MDM, physical


Personally performed:  exam, history, MDM, supervision of care


Care discussed with:  Medical Student


Procedures:  n/a


Results interpretation:  Verified all documentation


Verification and Attestation of Medical Student E/M Service





A medical student performed and documented this service in my presence. I revie

wed and verified all information documented by the medical student and made 

modifications to such information, when appropriate. I personally performed the 

physical exam and medical decision making. 





 Bryan Agrawal, Jan 17, 2023,21:35











SANDRA HUMPHREYS                    Jan 17, 2023 07:07


BRYAN AGRAWAL DO              Jan 17, 2023 21:28
DISCHARGE